# Patient Record
Sex: MALE | Race: WHITE | Employment: UNEMPLOYED | URBAN - METROPOLITAN AREA
[De-identification: names, ages, dates, MRNs, and addresses within clinical notes are randomized per-mention and may not be internally consistent; named-entity substitution may affect disease eponyms.]

---

## 2017-03-07 ENCOUNTER — OFFICE VISIT (OUTPATIENT)
Dept: INTERNAL MEDICINE CLINIC | Age: 22
End: 2017-03-07

## 2017-03-07 VITALS
SYSTOLIC BLOOD PRESSURE: 126 MMHG | HEART RATE: 75 BPM | BODY MASS INDEX: 30.24 KG/M2 | HEIGHT: 71 IN | TEMPERATURE: 97.5 F | WEIGHT: 216 LBS | DIASTOLIC BLOOD PRESSURE: 82 MMHG | RESPIRATION RATE: 16 BRPM

## 2017-03-07 DIAGNOSIS — R10.84 GENERALIZED ABDOMINAL PAIN: ICD-10-CM

## 2017-03-07 DIAGNOSIS — K52.9 GASTROENTERITIS: Primary | ICD-10-CM

## 2017-03-07 RX ORDER — PROMETHAZINE HYDROCHLORIDE 12.5 MG/1
12.5 TABLET ORAL
Qty: 15 TAB | Refills: 0 | Status: SHIPPED | OUTPATIENT
Start: 2017-03-07 | End: 2017-12-21 | Stop reason: ALTCHOICE

## 2017-03-07 NOTE — MR AVS SNAPSHOT
Visit Information Date & Time Provider Department Dept. Phone Encounter #  
 3/7/2017  1:30 PM Ernesto Albrecht MD [place] [address] [id number] Follow-up Instructions Return if symptoms worsen or fail to improve. Follow-up and Disposition History Upcoming Health Maintenance Date Due  
 HPV AGE 9Y-34Y (1 of 3 - Male 3 Dose Series) 1/9/2007 INFLUENZA AGE 9 TO ADULT 8/1/2016 DTaP/Tdap/Td series (1 - Tdap) 1/9/2017 Allergies as of 3/7/2017  Review Complete On: 3/7/2017 By: Ernesto Albrecht MD  
 No Known Allergies Current Immunizations  Reviewed on 6/25/2012 Name Date PPD 6/25/2012, 9/28/2011 Not reviewed this visit You Were Diagnosed With   
  
 Codes Comments Gastroenteritis    -  Primary ICD-10-CM: K52.9 ICD-9-CM: 558.9 Generalized abdominal pain     ICD-10-CM: R10.84 ICD-9-CM: 789.07 Vitals BP Pulse Temp Resp Height(growth percentile) Weight(growth percentile) 126/82 75 97.5 °F (36.4 °C) 16 5' 11\" (1.803 m) 216 lb (98 kg) BMI Smoking Status 30.13 kg/m2 Never Smoker BMI and BSA Data Body Mass Index Body Surface Area  
 30.13 kg/m 2 2.22 m 2 Preferred Pharmacy Pharmacy Name Phone [facility] [address] [place] [address] [phone] Your Updated Medication List  
  
   
This list is accurate as of: 3/7/17  2:01 PM.  Always use your most recent med list.  
  
  
  
  
 ibuprofen 600 mg tablet Commonly known as:  MOTRIN Take 1 Tab by mouth every six (6) hours as needed for Pain. promethazine 12.5 mg tablet Commonly known as:  PHENERGAN Take 1 Tab by mouth every six (6) hours as needed for Nausea. Prescriptions Sent to Pharmacy Refills  
 promethazine (PHENERGAN) 12.5 mg tablet 0 Sig: Take 1 Tab by mouth every six (6) hours as needed for Nausea.   
 Class: Normal  
 Pharmacy: [facility] [id number] [place] [facility] [address] , [address] [facility] #: [phone] Route: Oral  
  
We Performed the Following AMYLASE C2089805 CPT(R)] CBC W/O DIFF [57558 CPT(R)]   
 LD [78222 CPT(R)] LIPASE [id number] CPT(R)] METABOLIC PANEL, COMPREHENSIVE [78114 CPT(R)] Follow-up Instructions Return if symptoms worsen or fail to improve. Patient Instructions Gastroenteritis: Care Instructions Your Care Instructions Gastroenteritis is an illness that may cause nausea, vomiting, and diarrhea. It is sometimes called \"stomach flu. \" It can be caused by bacteria or a virus. You will probably begin to feel better in 1 to 2 days. In the meantime, get plenty of rest and make sure you do not become dehydrated. Dehydration occurs when your body loses too much fluid. Follow-up care is a key part of your treatment and safety. Be sure to make and go to all appointments, and call your doctor if you are having problems. Its also a good idea to know your test results and keep a list of the medicines you take. How can you care for yourself at home? · If your doctor prescribed antibiotics, take them as directed. Do not stop taking them just because you feel better. You need to take the full course of antibiotics. · Drink plenty of fluids to prevent dehydration, enough so that your urine is light yellow or clear like water. Choose water and other caffeine-free clear liquids until you feel better. If you have kidney, heart, or liver disease and have to limit fluids, talk with your doctor before you increase your fluid intake. · Drink fluids slowly, in frequent, small amounts, because drinking too much too fast can cause vomiting. · Begin eating mild foods, such as dry toast, yogurt, applesauce, bananas, and rice. Avoid spicy, hot, or high-fat foods, and do not drink alcohol or caffeine for a day or two. Do not drink milk or eat ice cream until you are feeling better. How to prevent gastroenteritis · Keep hot foods hot and cold foods cold. · Do not eat meats, dressings, salads, or other foods that have been kept at room temperature for more than 2 hours. · Use a thermometer to check your refrigerator. It should be between 34°F and 40°F. 
· Defrost meats in the refrigerator or microwave, not on the kitchen counter. · Keep your hands and your kitchen clean. Wash your hands, cutting boards, and countertops with hot soapy water frequently. · Cook meat until it is well done. · Do not eat raw eggs or uncooked sauces made with raw eggs. · Do not take chances. If food looks or tastes spoiled, throw it out. When should you call for help? Call 911 anytime you think you may need emergency care. For example, call if: 
· You vomit blood or what looks like coffee grounds. · You passed out (lost consciousness). · You pass maroon or very bloody stools. Call your doctor now or seek immediate medical care if: 
· You have severe belly pain. · You have signs of needing more fluids. You have sunken eyes, a dry mouth, and pass only a little dark urine. · You feel like you are going to faint. · You have increased belly pain that does not go away in 1 to 2 days. · You have new or increased nausea, or you are vomiting. · You have a new or higher fever. · Your stools are black and tarlike or have streaks of blood. Watch closely for changes in your health, and be sure to contact your doctor if: 
· You are dizzy or lightheaded. · You urinate less than usual, or your urine is dark yellow or brown. · You do not feel better with each day that goes by. Where can you learn more? Go to http://eric-gladys.info/. Enter N142 in the search box to learn more about \"Gastroenteritis: Care Instructions. \" Current as of: May 24, 2016 Content Version: 11.1 © 3007-0588 [employer].  Care instructions adapted under license by [address] (which disclaims liability or warranty for this information). If you have questions about a medical condition or this instruction, always ask your healthcare professional. [address] any warranty or liability for your use of this information. Introducing [facility] & HEALTH SERVICES! Gian Dylon introduces [employer] patient portal. Now you can access parts of your medical record, email your doctor's office, and request medication refills online. 1. In your internet browser, go to https://WP [email or web address]. [employer] 2. Click on the First Time User? Click Here link in the Sign In box. You will see the New Member Sign Up page. 3. Enter your [employer] Access Code exactly as it appears below. You will not need to use this code after youve completed the sign-up process. If you do not sign up before the expiration date, you must request a new code. · [employer] Access Code: [email or web address] Expires: 6/5/2017  2:01 PM 
 
4. Enter the last four digits of your Social Security Number (xxxx) and Date of Birth (mm/dd/yyyy) as indicated and click Submit. You will be taken to the next sign-up page. 5. Create a [employer] ID. This will be your [employer] login ID and cannot be changed, so think of one that is secure and easy to remember. 6. Create a [employer] password. You can change your password at any time. 7. Enter your Password Reset Question and Answer. This can be used at a later time if you forget your password. 8. Enter your e-mail address. You will receive e-mail notification when new information is available in [address] 9. Click Sign Up. You can now view and download portions of your medical record. 10. Click the Download Summary menu link to download a portable copy of your medical information. If you have questions, please visit the Frequently Asked Questions section of the [employer] website.  Remember, [employer] is NOT to be used for urgent needs. For medical emergencies, dial 911. Now available from your iPhone and Android! Please provide this summary of care documentation to your next provider. Your primary care clinician is listed as John Guzman. If you have any questions after today's visit, please call [phone].

## 2017-03-07 NOTE — PROGRESS NOTES
Chief Complaint   Patient presents with    Abdominal Pain     he is a 25y.o. year old male who presents for evaluation of  abdominal pain. The pain is described as dull, aching and knots, and is 3-8/10 in intensity. Pain is located in the RUQ, LUQ without radiation. Onset was 4 days ago. Symptoms have been unchanged since. Aggravating factors: activity, pressure and eating. Alleviating factors: none. Associated symptoms: anorexia, belching, chills, diarrhea, nausea and vomiting. The patient denies constipation. Reviewed and agree with Nurse Note and duplicated in this note. Reviewed PmHx, RxHx, FmHx, SocHx, AllgHx and updated and dated in the chart.     Family History   Problem Relation Age of Onset    Diabetes Father     High Cholesterol Father     Arthritis-osteo Father     Cancer Maternal Grandfather      colon cancer    Cancer Paternal Grandfather      lung cancer    Alcohol abuse Paternal Grandfather        Past Medical History:   Diagnosis Date    AR (allergic rhinitis)     Cholesteatoma     History of perforated ear drum     right    Cushing teeth extracted 2012      Social History     Social History    Marital status: SINGLE     Spouse name: N/A    Number of children: N/A    Years of education: N/A     Social History Main Topics    Smoking status: Never Smoker    Smokeless tobacco: Never Used    Alcohol use No    Drug use: No    Sexual activity: No     Other Topics Concern    Not on file     Social History Narrative    No narrative on file        Review of Systems - negative except as listed above      Objective:     Vitals:    03/07/17 1321   BP: 126/82   Pulse: 75   Resp: 16   Temp: 97.5 °F (36.4 °C)   Weight: 216 lb (98 kg)   Height: 5' 11\" (1.803 m)       Physical Examination: General appearance - alert, well appearing, and in no distress  Eyes - pupils equal and reactive, extraocular eye movements intact  Ears - bilateral TM's and external ear canals normal  Nose - normal and patent, no erythema, discharge or polyps  Mouth - mucous membranes moist, pharynx normal without lesions  Chest - clear to auscultation, no wheezes, rales or rhonchi, symmetric air entry  Heart - normal rate, regular rhythm, normal S1, S2, no murmurs, rubs, clicks or gallops  Abdomen - tenderness noted in RLQ, LLQ and LUQ  no rebound tenderness noted  bowel sounds hypoactive  Extremities - peripheral pulses normal, no pedal edema, no clubbing or cyanosis  Skin - normal coloration and turgor, no rashes, no suspicious skin lesions noted    Assessment/ Plan:   Pa House was seen today for abdominal pain. Diagnoses and all orders for this visit:    Gastroenteritis  -     CBC W/O DIFF  -     AMYLASE  -     LIPASE  -     METABOLIC PANEL, COMPREHENSIVE  -     LDH    Generalized abdominal pain       Follow-up Disposition: Not on File    I have discussed the diagnosis with the patient and the intended plan as seen in the above orders. The patient has received an after-visit summary and questions were answered concerning future plans. Medication Side Effects and Warnings were discussed with patient: yes  Patient Labs were reviewed and or requested: yes  Patient Past Records were reviewed and or requested  yes  I have discussed the diagnosis with the patient and the intended plan as seen in the above orders. The patient has received an after-visit summary and questions were answered concerning future plans. Pt agrees to call or return to clinic and/or go to closest ER with any worsening of symptoms. This may include, but not limited to increased fever (>100.4) with NSAIDS or Tylenol, increased edema, confusion, rash, worsening of presenting symptoms. 1) Remember to stay active and/or exercise regularly (I suggest 30-45 minutes daily)   2) For reliable dietary information, go to www. EATRIGHT.org. You may wish to consider seeing the nutritionist at [facility] at #[phone] or [phone], also consider the Mediterranean diet.   3) I routinely suggest a complete physical exam once each year (your birth month)

## 2017-03-07 NOTE — PATIENT INSTRUCTIONS
Gastroenteritis: Care Instructions  Your Care Instructions  Gastroenteritis is an illness that may cause nausea, vomiting, and diarrhea. It is sometimes called \"stomach flu. \" It can be caused by bacteria or a virus. You will probably begin to feel better in 1 to 2 days. In the meantime, get plenty of rest and make sure you do not become dehydrated. Dehydration occurs when your body loses too much fluid. Follow-up care is a key part of your treatment and safety. Be sure to make and go to all appointments, and call your doctor if you are having problems. Its also a good idea to know your test results and keep a list of the medicines you take. How can you care for yourself at home? · If your doctor prescribed antibiotics, take them as directed. Do not stop taking them just because you feel better. You need to take the full course of antibiotics. · Drink plenty of fluids to prevent dehydration, enough so that your urine is light yellow or clear like water. Choose water and other caffeine-free clear liquids until you feel better. If you have kidney, heart, or liver disease and have to limit fluids, talk with your doctor before you increase your fluid intake. · Drink fluids slowly, in frequent, small amounts, because drinking too much too fast can cause vomiting. · Begin eating mild foods, such as dry toast, yogurt, applesauce, bananas, and rice. Avoid spicy, hot, or high-fat foods, and do not drink alcohol or caffeine for a day or two. Do not drink milk or eat ice cream until you are feeling better. How to prevent gastroenteritis  · Keep hot foods hot and cold foods cold. · Do not eat meats, dressings, salads, or other foods that have been kept at room temperature for more than 2 hours. · Use a thermometer to check your refrigerator. It should be between 34°F and 40°F.  · Defrost meats in the refrigerator or microwave, not on the kitchen counter. · Keep your hands and your kitchen clean.  Wash your hands, cutting boards, and countertops with hot soapy water frequently. · Cook meat until it is well done. · Do not eat raw eggs or uncooked sauces made with raw eggs. · Do not take chances. If food looks or tastes spoiled, throw it out. When should you call for help? Call 911 anytime you think you may need emergency care. For example, call if:  · You vomit blood or what looks like coffee grounds. · You passed out (lost consciousness). · You pass maroon or very bloody stools. Call your doctor now or seek immediate medical care if:  · You have severe belly pain. · You have signs of needing more fluids. You have sunken eyes, a dry mouth, and pass only a little dark urine. · You feel like you are going to faint. · You have increased belly pain that does not go away in 1 to 2 days. · You have new or increased nausea, or you are vomiting. · You have a new or higher fever. · Your stools are black and tarlike or have streaks of blood. Watch closely for changes in your health, and be sure to contact your doctor if:  · You are dizzy or lightheaded. · You urinate less than usual, or your urine is dark yellow or brown. · You do not feel better with each day that goes by. Where can you learn more? Go to http://eric-gladys.info/. Enter N142 in the search box to learn more about \"Gastroenteritis: Care Instructions. \"  Current as of: May 24, 2016  Content Version: 11.1  © 4131-0575 [facility], Incorporated. Care instructions adapted under license by [employer] (which disclaims liability or warranty for this information). If you have questions about a medical condition or this instruction, always ask your healthcare professional. [address] any warranty or liability for your use of this information.

## 2017-03-08 LAB
ALBUMIN SERPL-MCNC: 3.9 G/DL (ref 3.5–5.5)
ALBUMIN/GLOB SERPL: 1.4 {RATIO} (ref 1.1–2.5)
ALP SERPL-CCNC: 62 IU/L (ref 39–117)
ALT SERPL-CCNC: 48 IU/L (ref 0–44)
AMYLASE SERPL-CCNC: 26 U/L (ref 31–124)
AST SERPL-CCNC: 32 IU/L (ref 0–40)
BILIRUB SERPL-MCNC: 0.4 MG/DL (ref 0–1.2)
BUN SERPL-MCNC: 11 MG/DL (ref 6–20)
BUN/CREAT SERPL: 9 (ref 8–19)
CALCIUM SERPL-MCNC: 8.8 MG/DL (ref 8.7–10.2)
CHLORIDE SERPL-SCNC: 95 MMOL/L (ref 96–106)
CO2 SERPL-SCNC: 26 MMOL/L (ref 18–29)
CREAT SERPL-MCNC: 1.24 MG/DL (ref 0.76–1.27)
ERYTHROCYTE [DISTWIDTH] IN BLOOD BY AUTOMATED COUNT: 14.2 % (ref 12.3–15.4)
GLOBULIN SER CALC-MCNC: 2.8 G/DL (ref 1.5–4.5)
GLUCOSE SERPL-MCNC: 93 MG/DL (ref 65–99)
HCT VFR BLD AUTO: 46.3 % (ref 37.5–51)
HGB BLD-MCNC: 16.1 G/DL (ref 12.6–17.7)
LDH SERPL-CCNC: 258 IU/L (ref 121–224)
LIPASE SERPL-CCNC: 17 U/L (ref 0–59)
MCH RBC QN AUTO: 30.9 PG (ref 26.6–33)
MCHC RBC AUTO-ENTMCNC: 34.8 G/DL (ref 31.5–35.7)
MCV RBC AUTO: 89 FL (ref 79–97)
NRBC BLD AUTO-RTO: 0 %
PLATELET # BLD AUTO: 212 X10E3/UL (ref 150–379)
POTASSIUM SERPL-SCNC: 4.5 MMOL/L (ref 3.5–5.2)
PROT SERPL-MCNC: 6.7 G/DL (ref 6–8.5)
RBC # BLD AUTO: 5.21 X10E6/UL (ref 4.14–5.8)
SODIUM SERPL-SCNC: 138 MMOL/L (ref 134–144)
WBC # BLD AUTO: 3.6 X10E3/UL (ref 3.4–10.8)

## 2017-12-21 ENCOUNTER — OFFICE VISIT (OUTPATIENT)
Dept: PRIMARY CARE CLINIC | Age: 22
End: 2017-12-21

## 2017-12-21 VITALS
TEMPERATURE: 98.6 F | BODY MASS INDEX: 32.84 KG/M2 | WEIGHT: 234.6 LBS | RESPIRATION RATE: 17 BRPM | HEART RATE: 89 BPM | OXYGEN SATURATION: 97 % | SYSTOLIC BLOOD PRESSURE: 134 MMHG | DIASTOLIC BLOOD PRESSURE: 87 MMHG | HEIGHT: 71 IN

## 2017-12-21 DIAGNOSIS — K52.9 GASTROENTERITIS: Primary | ICD-10-CM

## 2017-12-21 LAB
QUICKVUE INFLUENZA TEST: NEGATIVE
S PYO AG THROAT QL: NEGATIVE
VALID INTERNAL CONTROL?: YES
VALID INTERNAL CONTROL?: YES

## 2017-12-21 RX ORDER — ONDANSETRON 4 MG/1
4 TABLET, ORALLY DISINTEGRATING ORAL
Qty: 10 TAB | Refills: 0 | Status: SHIPPED | OUTPATIENT
Start: 2017-12-21 | End: 2018-01-05 | Stop reason: ALTCHOICE

## 2017-12-21 NOTE — LETTER
NOTIFICATION RETURN TO WORK / SCHOOL 
 
12/21/2017 8:57 AM 
 
Mr. Roman Basilio 
[facility] [address] 
[address] [address] To Whom It May Concern: 
 
Roman Basilio is currently under the care of [address] [place] [address]. He will return to work/school on 12/23/2017. If there are questions or concerns please have the patient contact our office. Sincerely, Mary Mcdowell NP

## 2017-12-21 NOTE — PROGRESS NOTES
Chief Complaint   Patient presents with    Chills    Generalized Body Aches    Vomiting   pt c/o above symptoms since yesterday, he states he has received flu shot. Pt states he has taken ibuprofen to help with discomfort. This note will not be viewable in [address]

## 2017-12-21 NOTE — MR AVS SNAPSHOT
Visit Information Date & Time Provider Department Dept. Phone Encounter #  
 12/21/2017  8:15 AM Alfredo Peter NP [address] [place] [address] [phone] [id number] Follow-up Instructions Return if symptoms worsen or fail to improve. Upcoming Health Maintenance Date Due  
 HPV AGE 9Y-34Y (1 of 3 - Male 3 Dose Series) 1/9/2007 DTaP/Tdap/Td series (1 - Tdap) 1/9/2017 Allergies as of 12/21/2017  Review Complete On: 12/21/2017 By: Alfredo Peter NP No Known Allergies Current Immunizations  Reviewed on 6/25/2012 Name Date PPD 6/25/2012, 9/28/2011 Not reviewed this visit You Were Diagnosed With   
  
 Codes Comments Gastroenteritis    -  Primary ICD-10-CM: K52.9 ICD-9-CM: 558. 9 Vitals BP Pulse Temp Resp Height(growth percentile) Weight(growth percentile) 134/87 (BP 1 Location: Left arm, BP Patient Position: Sitting) 89 98.6 °F (37 °C) (Oral) 17 5' 11\" (1.803 m) 234 lb 9.6 oz (106.4 kg) SpO2 BMI Smoking Status 97% 32.72 kg/m2 Never Smoker BMI and BSA Data Body Mass Index Body Surface Area 32.72 kg/m 2 2.31 m 2 Preferred Pharmacy Pharmacy Name Phone [facility] [address] [place] [address], [place] [address] [phone] Your Updated Medication List  
  
   
This list is accurate as of: 12/21/17  8:55 AM.  Always use your most recent med list.  
  
  
  
  
 ondansetron 4 mg disintegrating tablet Commonly known as:  ZOFRAN ODT Take 1 Tab by mouth every eight (8) hours as needed for Nausea. Prescriptions Sent to Pharmacy Refills  
 ondansetron (ZOFRAN ODT) 4 mg disintegrating tablet 0 Sig: Take 1 Tab by mouth every eight (8) hours as needed for Nausea. Class: Normal  
 Pharmacy: [facility] [address] [place] [address] [address], [address] [place] [address] #: [phone] Route: Oral  
  
Follow-up Instructions Return if symptoms worsen or fail to improve. Patient Instructions Gastroenteritis: Care Instructions Your Care Instructions Gastroenteritis is an illness that may cause nausea, vomiting, and diarrhea. It is sometimes called \"stomach flu. \" It can be caused by bacteria or a virus. You will probably begin to feel better in 1 to 2 days. In the meantime, get plenty of rest and make sure you do not become dehydrated. Dehydration occurs when your body loses too much fluid. Follow-up care is a key part of your treatment and safety. Be sure to make and go to all appointments, and call your doctor if you are having problems. It's also a good idea to know your test results and keep a list of the medicines you take. How can you care for yourself at home? · If your doctor prescribed antibiotics, take them as directed. Do not stop taking them just because you feel better. You need to take the full course of antibiotics. · Drink plenty of fluids to prevent dehydration, enough so that your urine is light yellow or clear like water. Choose water and other caffeine-free clear liquids until you feel better. If you have kidney, heart, or liver disease and have to limit fluids, talk with your doctor before you increase your fluid intake. · Drink fluids slowly, in frequent, small amounts, because drinking too much too fast can cause vomiting. · Begin eating mild foods, such as dry toast, yogurt, applesauce, bananas, and rice. Avoid spicy, hot, or high-fat foods, and do not drink alcohol or caffeine for a day or two. Do not drink milk or eat ice cream until you are feeling better. How to prevent gastroenteritis · Keep hot foods hot and cold foods cold. · Do not eat meats, dressings, salads, or other foods that have been kept at room temperature for more than 2 hours. · Use a thermometer to check your refrigerator. It should be between 34°F and 40°F. 
· Defrost meats in the refrigerator or microwave, not on the kitchen counter. · Keep your hands and your kitchen clean. Wash your hands, cutting boards, and countertops with hot soapy water frequently. · Cook meat until it is well done. · Do not eat raw eggs or uncooked sauces made with raw eggs. · Do not take chances. If food looks or tastes spoiled, throw it out. When should you call for help? Call 911 anytime you think you may need emergency care. For example, call if: 
? · You vomit blood or what looks like coffee grounds. ? · You passed out (lost consciousness). ? · You pass maroon or very bloody stools. ?Call your doctor now or seek immediate medical care if: 
? · You have severe belly pain. ? · You have signs of needing more fluids. You have sunken eyes, a dry mouth, and pass only a little dark urine. ? · You feel like you are going to faint. ? · You have increased belly pain that does not go away in 1 to 2 days. ? · You have new or increased nausea, or you are vomiting. ? · You have a new or higher fever. ? · Your stools are black and tarlike or have streaks of blood. ? Watch closely for changes in your health, and be sure to contact your doctor if: 
? · You are dizzy or lightheaded. ? · You urinate less than usual, or your urine is dark yellow or brown. ? · You do not feel better with each day that goes by. Where can you learn more? Go to http://eric-gladys.info/. Enter N142 in the search box to learn more about \"Gastroenteritis: Care Instructions. \" Current as of: March 3, 2017 Content Version: 11.4 © 0403-0206 [employer]. Care instructions adapted under license by [employer] (which disclaims liability or warranty for this information). If you have questions about a medical condition or this instruction, always ask your healthcare professional. [address] any warranty or liability for your use of this information. Introducing [facility] & HEALTH SERVICES! [facility] introduces [employer] patient portal. Now you can access parts of your medical record, email your doctor's office, and request medication refills online. 1. In your internet browser, go to [email or web address] 2. Click on the First Time User? Click Here link in the Sign In box. You will see the New Member Sign Up page. 3. Enter your [employer] Access Code exactly as it appears below. You will not need to use this code after youve completed the sign-up process. If you do not sign up before the expiration date, you must request a new code. · [employer] Access Code: [email or web address] Expires: 3/21/2018  8:54 AM 
 
4. Enter the last four digits of your Social Security Number (xxxx) and Date of Birth (mm/dd/yyyy) as indicated and click Submit. You will be taken to the next sign-up page. 5. Create a [employer] ID. This will be your [employer] login ID and cannot be changed, so think of one that is secure and easy to remember. 6. Create a [employer] password. You can change your password at any time. 7. Enter your Password Reset Question and Answer. This can be used at a later time if you forget your password. 8. Enter your e-mail address. You will receive e-mail notification when new information is available in [address] 9. Click Sign Up. You can now view and download portions of your medical record. 10. Click the Download Summary menu link to download a portable copy of your medical information. If you have questions, please visit the Frequently Asked Questions section of the [employer] website. Remember, [employer] is NOT to be used for urgent needs. For medical emergencies, dial 911. Now available from your iPhone and Android! Please provide this summary of care documentation to your next provider. If you have any questions after today's visit, please call [phone].

## 2017-12-21 NOTE — PROGRESS NOTES
HISTORY OF PRESENT ILLNESS  Kayla Saxena is a 25 y.o. male. Chills    Associated symptoms include vomiting. Generalized Body Aches     Vomiting    Associated symptoms include chills. Chief Complaint   Patient presents with    Chills    Generalized Body Aches    Vomiting     Pt presents today with c/o 1 day of nausea, vomiting, h/a, and diarrhea. He also c/o myalgias and chills. Denies any fevers. Denies any cough, runny nose, sore throat, or abdominal pain. He is taking Advil. He has not eaten today but has been drinking water today. No vomiting today. He has had a flu shot this season. Past Medical History:   Diagnosis Date    AR (allergic rhinitis)     Cholesteatoma     History of perforated ear drum     right    [place] teeth extracted 2012     Past Surgical History:   Procedure Laterality Date    HX TYMPANOSTOMY      LAP,INGUINAL HERNIA REPR,INITIAL       No Known Allergies    Review of Systems   Constitutional: Positive for chills. Gastrointestinal: Positive for vomiting. All other systems reviewed and are negative. Physical Exam   Constitutional: He is oriented to person, place, and time. He appears well-developed and well-nourished. No distress. HENT:   Head: Normocephalic and atraumatic. Right Ear: External ear normal.   Left Ear: External ear normal.   Nose: Nose normal.   Mouth/Throat: Uvula is midline and mucous membranes are normal. Posterior oropharyngeal edema (mild) present. No oropharyngeal exudate or tonsillar abscesses. Posterior oropharyngeal erythema: mild. Eyes: Conjunctivae, EOM and lids are normal. Pupils are equal, round, and reactive to light. Lids are everted and swept, no foreign bodies found. Neck: Normal range of motion. Neck supple. Cardiovascular: Normal rate, regular rhythm and normal heart sounds. Pulmonary/Chest: Effort normal and breath sounds normal.   Abdominal: Soft. Normal appearance. Bowel sounds are increased.  There is no hepatosplenomegaly. There is tenderness (mild tenderness) in the right upper quadrant, right lower quadrant, epigastric area and left upper quadrant. There is no rigidity and no guarding. Lymphadenopathy:     He has no cervical adenopathy. Neurological: He is alert and oriented to person, place, and time. He has normal strength. Gait normal.   Skin: Skin is warm, dry and intact. Results for orders placed or performed in visit on 12/21/17   AMB POC RAPID STREP A   Result Value Ref Range    VALID INTERNAL CONTROL POC Yes     Group A Strep Ag Negative Negative   AMB POC RAPID INFLUENZA TEST   Result Value Ref Range    VALID INTERNAL CONTROL POC Yes     QuickVue Influenza test Negative Negative     ASSESSMENT and PLAN    ICD-10-CM ICD-9-CM    1. Gastroenteritis K52.9 558.9 AMB POC RAPID STREP A      AMB POC RAPID INFLUENZA TEST     Orders Placed This Encounter    AMB POC RAPID STREP A    AMB POC RAPID INFLUENZA TEST    ondansetron (ZOFRAN ODT) 4 mg disintegrating tablet     Clear liquids, then advance slowly as tolerated. Work note given per request.  RTC prn. Josselin Hunter NP  This note will not be viewable in [address]

## 2017-12-21 NOTE — PATIENT INSTRUCTIONS
Gastroenteritis: Care Instructions  Your Care Instructions    Gastroenteritis is an illness that may cause nausea, vomiting, and diarrhea. It is sometimes called \"stomach flu. \" It can be caused by bacteria or a virus. You will probably begin to feel better in 1 to 2 days. In the meantime, get plenty of rest and make sure you do not become dehydrated. Dehydration occurs when your body loses too much fluid. Follow-up care is a key part of your treatment and safety. Be sure to make and go to all appointments, and call your doctor if you are having problems. It's also a good idea to know your test results and keep a list of the medicines you take. How can you care for yourself at home? · If your doctor prescribed antibiotics, take them as directed. Do not stop taking them just because you feel better. You need to take the full course of antibiotics. · Drink plenty of fluids to prevent dehydration, enough so that your urine is light yellow or clear like water. Choose water and other caffeine-free clear liquids until you feel better. If you have kidney, heart, or liver disease and have to limit fluids, talk with your doctor before you increase your fluid intake. · Drink fluids slowly, in frequent, small amounts, because drinking too much too fast can cause vomiting. · Begin eating mild foods, such as dry toast, yogurt, applesauce, bananas, and rice. Avoid spicy, hot, or high-fat foods, and do not drink alcohol or caffeine for a day or two. Do not drink milk or eat ice cream until you are feeling better. How to prevent gastroenteritis  · Keep hot foods hot and cold foods cold. · Do not eat meats, dressings, salads, or other foods that have been kept at room temperature for more than 2 hours. · Use a thermometer to check your refrigerator. It should be between 34°F and 40°F.  · Defrost meats in the refrigerator or microwave, not on the kitchen counter. · Keep your hands and your kitchen clean.  Wash your hands, cutting boards, and countertops with hot soapy water frequently. · Cook meat until it is well done. · Do not eat raw eggs or uncooked sauces made with raw eggs. · Do not take chances. If food looks or tastes spoiled, throw it out. When should you call for help? Call 911 anytime you think you may need emergency care. For example, call if:  ? · You vomit blood or what looks like coffee grounds. ? · You passed out (lost consciousness). ? · You pass maroon or very bloody stools. ?Call your doctor now or seek immediate medical care if:  ? · You have severe belly pain. ? · You have signs of needing more fluids. You have sunken eyes, a dry mouth, and pass only a little dark urine. ? · You feel like you are going to faint. ? · You have increased belly pain that does not go away in 1 to 2 days. ? · You have new or increased nausea, or you are vomiting. ? · You have a new or higher fever. ? · Your stools are black and tarlike or have streaks of blood. ? Watch closely for changes in your health, and be sure to contact your doctor if:  ? · You are dizzy or lightheaded. ? · You urinate less than usual, or your urine is dark yellow or brown. ? · You do not feel better with each day that goes by. Where can you learn more? Go to http://eric-gladys.info/. Enter N142 in the search box to learn more about \"Gastroenteritis: Care Instructions. \"  Current as of: March 3, 2017  Content Version: 11.4  © 4777-9678 [employer]. Care instructions adapted under license by [employer] (which disclaims liability or warranty for this information). If you have questions about a medical condition or this instruction, always ask your healthcare professional. [address] any warranty or liability for your use of this information.

## 2018-01-05 ENCOUNTER — OFFICE VISIT (OUTPATIENT)
Dept: PRIMARY CARE CLINIC | Age: 23
End: 2018-01-05

## 2018-01-05 VITALS
DIASTOLIC BLOOD PRESSURE: 73 MMHG | WEIGHT: 221.2 LBS | BODY MASS INDEX: 30.97 KG/M2 | RESPIRATION RATE: 17 BRPM | HEART RATE: 101 BPM | TEMPERATURE: 99.5 F | HEIGHT: 71 IN | SYSTOLIC BLOOD PRESSURE: 108 MMHG | OXYGEN SATURATION: 95 %

## 2018-01-05 DIAGNOSIS — J03.90 TONSILLITIS: Primary | ICD-10-CM

## 2018-01-05 DIAGNOSIS — R68.89 FLU-LIKE SYMPTOMS: ICD-10-CM

## 2018-01-05 RX ORDER — OSELTAMIVIR PHOSPHATE 75 MG/1
75 CAPSULE ORAL 2 TIMES DAILY
Qty: 10 CAP | Refills: 0 | Status: SHIPPED | OUTPATIENT
Start: 2018-01-05 | End: 2018-01-10

## 2018-01-05 RX ORDER — AMOXICILLIN AND CLAVULANATE POTASSIUM 875; 125 MG/1; MG/1
1 TABLET, FILM COATED ORAL EVERY 12 HOURS
Qty: 20 TAB | Refills: 0 | Status: SHIPPED | OUTPATIENT
Start: 2018-01-05 | End: 2018-01-15

## 2018-01-05 NOTE — LETTER
NOTIFICATION RETURN TO WORK / SCHOOL 
 
1/5/2018 4:14 PM 
 
Mr. Iglesia Dumont 
[facility] 
[address] [address] To Whom It May Concern: 
 
Iglesia Dumont is currently under the care of [address] [place] [address]. He will return to work/school on 1/8/2018. If there are questions or concerns please have the patient contact our office. Sincerely, Fernando Earl NP

## 2018-01-05 NOTE — PATIENT INSTRUCTIONS
Tonsillitis: Care Instructions  Your Care Instructions    Tonsillitis is an infection of the tonsils that is caused by bacteria or a virus. The tonsils are in the back of the throat and are part of the immune system. Tonsillitis typically lasts from a few days up to a couple of weeks. Tonsillitis caused by a virus goes away on its own. Tonsillitis caused by the bacteria that causes strep throat is treated with antibiotics. You and your doctor may consider surgery to remove the tonsils (tonsillectomy) if you have serious complications or repeat infections. Follow-up care is a key part of your treatment and safety. Be sure to make and go to all appointments, and call your doctor if you are having problems. It's also a good idea to know your test results and keep a list of the medicines you take. How can you care for yourself at home? · If your doctor prescribed antibiotics, take them as directed. Do not stop taking them just because you feel better. You need to take the full course of antibiotics. · Gargle with warm salt water. This helps reduce swelling and relieve discomfort. Gargle once an hour with 1 teaspoon of salt mixed in 8 fluid ounces of warm water. · Take an over-the-counter pain medicine, such as acetaminophen (Tylenol), ibuprofen (Advil, Motrin), or naproxen (Aleve). Be safe with medicines. Read and follow all instructions on the label. No one younger than 20 should take aspirin. It has been linked to Reye syndrome, a serious illness. · Be careful when taking over-the-counter cold or flu medicines and Tylenol at the same time. Many of these medicines have acetaminophen, which is Tylenol. Read the labels to make sure that you are not taking more than the recommended dose. Too much acetaminophen (Tylenol) can be harmful. · Try an over-the-counter throat spray to relieve throat pain. · Drink plenty of fluids. Fluids may help soothe an irritated throat.  Drink warm or cool liquids (whichever feels better). These include tea, soup, and juice. · Do not smoke, and avoid secondhand smoke. Smoking can make tonsillitis worse. If you need help quitting, talk to your doctor about stop-smoking programs and medicines. These can increase your chances of quitting for good. · Use a vaporizer or humidifier to add moisture to your bedroom. Follow the directions for cleaning the machine. When should you call for help? Call your doctor now or seek immediate medical care if:  ? · Your pain gets worse on one side of your throat. ? · You have a new or higher fever. ? · You notice changes in your voice. ? · You have trouble opening your mouth. ? · You have any trouble breathing. ? · You have much more trouble swallowing. ? · You have a fever with a stiff neck or a severe headache. ? · You are sensitive to light or feel very sleepy or confused. ? Watch closely for changes in your health, and be sure to contact your doctor if:  ? · You do not get better after 2 days. Where can you learn more? Go to http://eric-gladys.info/. Enter P202 in the search box to learn more about \"Tonsillitis: Care Instructions. \"  Current as of: May 12, 2017  Content Version: 11.4  © 4547-1860 [employer]. Care instructions adapted under license by [employer] (which disclaims liability or warranty for this information). If you have questions about a medical condition or this instruction, always ask your healthcare professional. [place] [address] any warranty or liability for your use of this information.

## 2018-01-05 NOTE — PROGRESS NOTES
Chief Complaint   Patient presents with    Fever    Generalized Body Aches    Sore Throat   c/o fever, body aches, sore throat, sweating x 1 day, pt states he has been alternating between tylenol and advil to help with discomfort.

## 2018-01-05 NOTE — MR AVS SNAPSHOT
Visit Information Date & Time Provider Department Dept. Phone Encounter #  
 1/5/2018  3:45 PM Juan Polanco NP [address] [facility] [phone] [id number] Follow-up Instructions Return if symptoms worsen or fail to improve. Upcoming Health Maintenance Date Due  
 HPV AGE 9Y-34Y (1 of 3 - Male 3 Dose Series) 1/9/2007 DTaP/Tdap/Td series (1 - Tdap) 1/9/2017 Allergies as of 1/5/2018  Review Complete On: 1/5/2018 By: Juan Polanco NP No Known Allergies Current Immunizations  Reviewed on 6/25/2012 Name Date PPD 6/25/2012, 9/28/2011 Not reviewed this visit You Were Diagnosed With   
  
 Codes Comments Tonsillitis    -  Primary ICD-10-CM: J03.90 ICD-9-CM: 466 Flu-like symptoms     ICD-10-CM: R68.89 ICD-9-CM: 780.99 Vitals BP Pulse Temp Resp Height(growth percentile) Weight(growth percentile) 108/73 (BP 1 Location: Left arm, BP Patient Position: Sitting) (!) 101 99.5 °F (37.5 °C) (Oral) 17 5' 11\" (1.803 m) 221 lb 3.2 oz (100.3 kg) SpO2 BMI Smoking Status 95% 30.85 kg/m2 Never Smoker Vitals History BMI and BSA Data Body Mass Index Body Surface Area  
 30.85 kg/m 2 2.24 m 2 Preferred Pharmacy Pharmacy Name Phone [facility] [address] [place] [facility] [address] [facility], [place] [address] [phone] Your Updated Medication List  
  
   
This list is accurate as of: 1/5/18  4:13 PM.  Always use your most recent med list.  
  
  
  
  
 amoxicillin-clavulanate 875-125 mg per tablet Commonly known as:  AUGMENTIN Take 1 Tab by mouth every twelve (12) hours for 10 days. magic mouthwash solution Take 15 mL by mouth every four (4) hours as needed for Stomatitis. Magic mouth wash  Maalox Lidocaine 2% viscous  Diphenhydramine oral solution   Pharmacy to mix equal portions of ingredients to a total volume as indicated in the dispense amount. oseltamivir 75 mg capsule Commonly known as:  TAMIFLU Take 1 Cap by mouth two (2) times a day for 5 days. Prescriptions Printed Refills  
 magic mouthwash solution 0 Sig: Take 15 mL by mouth every four (4) hours as needed for Stomatitis. Magic mouth wash Maalox Lidocaine 2% viscous Diphenhydramine oral solution Pharmacy to mix equal portions of ingredients to a total volume as indicated in the dispense amount. Class: Print Route: Oral  
  
Prescriptions Sent to Pharmacy Refills  
 amoxicillin-clavulanate (AUGMENTIN) 875-125 mg per tablet 0 Sig: Take 1 Tab by mouth every twelve (12) hours for 10 days. Class: Normal  
 Pharmacy: [facility] [address] [place] [address], [address] [place] [address] Ph #: [phone] Route: Oral  
 oseltamivir (TAMIFLU) 75 mg capsule 0 Sig: Take 1 Cap by mouth two (2) times a day for 5 days. Class: Normal  
 Pharmacy: [facility] [address] [place] [address], [address] [place] [address] Ph #: [phone] Route: Oral  
  
We Performed the Following CULTURE, STREP THROAT [id number] CPT(R)] Follow-up Instructions Return if symptoms worsen or fail to improve. Patient Instructions Tonsillitis: Care Instructions Your Care Instructions Tonsillitis is an infection of the tonsils that is caused by bacteria or a virus. The tonsils are in the back of the throat and are part of the immune system. Tonsillitis typically lasts from a few days up to a couple of weeks. Tonsillitis caused by a virus goes away on its own. Tonsillitis caused by the bacteria that causes strep throat is treated with antibiotics. You and your doctor may consider surgery to remove the tonsils (tonsillectomy) if you have serious complications or repeat infections. Follow-up care is a key part of your treatment and safety.  Be sure to make and go to all appointments, and call your doctor if you are having problems. It's also a good idea to know your test results and keep a list of the medicines you take. How can you care for yourself at home? · If your doctor prescribed antibiotics, take them as directed. Do not stop taking them just because you feel better. You need to take the full course of antibiotics. · Gargle with warm salt water. This helps reduce swelling and relieve discomfort. Gargle once an hour with 1 teaspoon of salt mixed in 8 fluid ounces of warm water. · Take an over-the-counter pain medicine, such as acetaminophen (Tylenol), ibuprofen (Advil, Motrin), or naproxen (Aleve). Be safe with medicines. Read and follow all instructions on the label. No one younger than 20 should take aspirin. It has been linked to Reye syndrome, a serious illness. · Be careful when taking over-the-counter cold or flu medicines and Tylenol at the same time. Many of these medicines have acetaminophen, which is Tylenol. Read the labels to make sure that you are not taking more than the recommended dose. Too much acetaminophen (Tylenol) can be harmful. · Try an over-the-counter throat spray to relieve throat pain. · Drink plenty of fluids. Fluids may help soothe an irritated throat. Drink warm or cool liquids (whichever feels better). These include tea, soup, and juice. · Do not smoke, and avoid secondhand smoke. Smoking can make tonsillitis worse. If you need help quitting, talk to your doctor about stop-smoking programs and medicines. These can increase your chances of quitting for good. · Use a vaporizer or humidifier to add moisture to your bedroom. Follow the directions for cleaning the machine. When should you call for help? Call your doctor now or seek immediate medical care if: 
? · Your pain gets worse on one side of your throat. ? · You have a new or higher fever. ? · You notice changes in your voice. ? · You have trouble opening your mouth. ? · You have any trouble breathing. ? · You have much more trouble swallowing. ? · You have a fever with a stiff neck or a severe headache. ? · You are sensitive to light or feel very sleepy or confused. ? Watch closely for changes in your health, and be sure to contact your doctor if: 
? · You do not get better after 2 days. Where can you learn more? Go to http://eric-gladys.info/. Enter B498 in the search box to learn more about \"Tonsillitis: Care Instructions. \" Current as of: May 12, 2017 Content Version: 11.4 © 1875-1030 [employer]. Care instructions adapted under license by [employer] (which disclaims liability or warranty for this information). If you have questions about a medical condition or this instruction, always ask your healthcare professional. [address] any warranty or liability for your use of this information. Introducing [facility]! Pawel Corley introduces [email or web address] patient portal. Now you can access parts of your medical record, email your doctor's office, and request medication refills online. 1. In your internet browser, go to [email or web address] 2. Click on the First Time User? Click Here link in the Sign In box. You will see the New Member Sign Up page. 3. Enter your [email or web address] Access Code exactly as it appears below. You will not need to use this code after youve completed the sign-up process. If you do not sign up before the expiration date, you must request a new code. · [email or web address] Access Code: [email or web address] Expires: 3/21/2018  8:54 AM 
 
4. Enter the last four digits of your Social Security Number (xxxx) and Date of Birth (mm/dd/yyyy) as indicated and click Submit. You will be taken to the next sign-up page. 5. Create a [email or web address] ID. This will be your [email or web address] login ID and cannot be changed, so think of one that is secure and easy to remember. 6. Create a [employer] password. You can change your password at any time. 7. Enter your Password Reset Question and Answer. This can be used at a later time if you forget your password. 8. Enter your e-mail address. You will receive e-mail notification when new information is available in [address] 9. Click Sign Up. You can now view and download portions of your medical record. 10. Click the Download Summary menu link to download a portable copy of your medical information. If you have questions, please visit the Frequently Asked Questions section of the [employer] website. Remember, [employer] is NOT to be used for urgent needs. For medical emergencies, dial 911. Now available from your iPhone and Android! Please provide this summary of care documentation to your next provider. Your primary care clinician is listed as Lele Harvey. If you have any questions after today's visit, please call [phone].

## 2018-01-08 LAB — S PYO THROAT QL CULT: ABNORMAL

## 2018-01-09 NOTE — PROGRESS NOTES
Subjective:   Viry Silva is a 26 y.o. male who complains of sore throat, myalgias, headache, fever, sweats, and chills for 1 day, stable since that time. He denies a history of shortness of breath, nausea, vomiting and wheezing. His mother accompanies him today. Evaluation to date: none. Treatment to date: OTC products. Patient does not smoke cigarettes. Relevant PMH:   Past Medical History:   Diagnosis Date    AR (allergic rhinitis)     Cholesteatoma     History of perforated ear drum     right    [place] teeth extracted 2012     Past Surgical History:   Procedure Laterality Date    HX TYMPANOSTOMY      LAP,INGUINAL HERNIA REPR,INITIAL       No Known Allergies      Review of Systems  Pertinent items are noted in HPI. Objective:     Visit Vitals    /73 (BP 1 Location: Left arm, BP Patient Position: Sitting)    Pulse (!) 101    Temp 99.5 °F (37.5 °C) (Oral)    Resp 17    Ht 5' 11\" (1.803 m)    Wt 221 lb 3.2 oz (100.3 kg)    SpO2 95%    BMI 30.85 kg/m2     General:  alert, cooperative, no distress, but appears acutely ill   Eyes: Watery, no discharge   Ears: normal TM's and external ear canals AU   Sinuses: Normal paranasal sinuses without tenderness   Mouth:  Lips, mucosa, and tongue normal. Teeth and gums normal and abnormal findings: moderate oropharyngeal erythema, tonsillar hypertrophy 2+ and exudates present   Neck: supple, symmetrical, trachea midline and mild anterior cervical adenopathy. Heart: S1 and S2 normal, no murmurs noted. Lungs: clear to auscultation bilaterally   Abdomen: soft, non-tender. Bowel sounds normal. No masses,  no organomegaly        Rapid strep and flu swabs - negative    Assessment/Plan:       ICD-10-CM ICD-9-CM    1. Tonsillitis J03.90 463 CULTURE, STREP THROAT      AMB POC RAPID STREP A   2.  Flu-like symptoms R68.89 780.99 AMB POC RAPID INFLUENZA TEST     Orders Placed This Encounter    CULTURE, STREP THROAT    AMB POC RAPID STREP A    AMB POC RAPID INFLUENZA TEST    amoxicillin-clavulanate (AUGMENTIN) 875-125 mg per tablet    oseltamivir (TAMIFLU) 75 mg capsule    magic mouthwash solution     Will start Tamiflu for FLI and Augmentin for tonsillitis. Will send strep culture. Suggested symptomatic OTC remedies. Antibiotics per orders. RTC prn. Isha Fields NP  This note will not be viewable in [address]

## 2018-01-11 NOTE — PROGRESS NOTES
Called and spoke with pts mother, verified on HIPPA, advised that strep culture was negative, but does show beta-hemolytic colonies, advised that patient needs to finish full course of antibiotics. Voiced understanding, she states son was feeling much better since taking abx, very appreciative of phone call

## 2018-01-24 ENCOUNTER — OFFICE VISIT (OUTPATIENT)
Dept: INTERNAL MEDICINE CLINIC | Age: 23
End: 2018-01-24

## 2018-01-24 VITALS
HEIGHT: 71 IN | SYSTOLIC BLOOD PRESSURE: 116 MMHG | BODY MASS INDEX: 36.68 KG/M2 | DIASTOLIC BLOOD PRESSURE: 71 MMHG | WEIGHT: 262 LBS | HEART RATE: 67 BPM | RESPIRATION RATE: 17 BRPM | OXYGEN SATURATION: 97 % | TEMPERATURE: 98.5 F

## 2018-01-24 DIAGNOSIS — J32.0 MAXILLARY SINUSITIS, UNSPECIFIED CHRONICITY: Primary | ICD-10-CM

## 2018-01-24 DIAGNOSIS — Z20.9 INFECTIOUS DISEASE EXPOSURE: ICD-10-CM

## 2018-01-24 DIAGNOSIS — L90.6 STRETCH MARKS: ICD-10-CM

## 2018-01-24 RX ORDER — AZITHROMYCIN 250 MG/1
TABLET, FILM COATED ORAL
Qty: 6 TAB | Refills: 0 | Status: SHIPPED | OUTPATIENT
Start: 2018-01-24 | End: 2018-01-29

## 2018-01-24 NOTE — MR AVS SNAPSHOT
[address] [place] [address]. [place] [address] [id number] 
[phone] Patient: Adeline Torres MRN: [id number] :1995 Visit Information Date & Time Provider Department Dept. Phone Encounter #  
 2018  2:45 PM Nuha Aj MD [facility] [phone] [id number] Follow-up Instructions Return if symptoms worsen or fail to improve. Follow-up and Disposition History Upcoming Health Maintenance Date Due DTaP/Tdap/Td series (1 - Tdap) 2017 Allergies as of 2018  Review Complete On: 2018 By: Nuha Aj MD  
 No Known Allergies Current Immunizations  Reviewed on 2012 Name Date PPD 2012, 2011 Not reviewed this visit You Were Diagnosed With   
  
 Codes Comments Maxillary sinusitis, unspecified chronicity    -  Primary ICD-10-CM: J32.0 ICD-9-CM: 473.0 Infectious disease exposure     ICD-10-CM: Z20.9 ICD-9-CM: V01.9 Stretch marks     ICD-10-CM: L90.6 ICD-9-CM: 701.3 Vitals BP Pulse Temp Resp Height(growth percentile) Weight(growth percentile) 116/71 (BP 1 Location: Left arm, BP Patient Position: Sitting) 67 98.5 °F (36.9 °C) (Oral) 17 5' 11\" (1.803 m) 262 lb (118.8 kg) SpO2 BMI Smoking Status 97% 36.54 kg/m2 Never Smoker Vitals History BMI and BSA Data Body Mass Index Body Surface Area  
 36.54 kg/m 2 2.44 m 2 Preferred Pharmacy Pharmacy Name Phone [facility] [address] [place] [facility] - [address] , [place] [address] [phone] Your Updated Medication List  
  
   
This list is accurate as of: 18  3:14 PM.  Always use your most recent med list.  
  
  
  
  
 azithromycin 250 mg tablet Commonly known as:  Porfirio Brand Take 2 tablets today, then take 1 tablet daily  
  
 magic mouthwash solution Take 15 mL by mouth every four (4) hours as needed for Stomatitis.  Magic mouth wash  Maalox Lidocaine 2% viscous  Diphenhydramine oral solution   Pharmacy to mix equal portions of ingredients to a total volume as indicated in the dispense amount. Prescriptions Sent to Pharmacy Refills  
 azithromycin (ZITHROMAX) 250 mg tablet 0 Sig: Take 2 tablets today, then take 1 tablet daily Class: Normal  
 Pharmacy: [facility] [id number] [place] [facility] - [address], [address]  #: [phone] We Performed the Following HEPATITIS PANEL, ACUTE [74083 CPT(R)] HIV 1/2 AG/AB, 4TH GENERATION,W RFLX CONFIRM [id number] CPT(R)] REFERRAL TO DERMATOLOGY [REF19 Custom] Follow-up Instructions Return if symptoms worsen or fail to improve. Referral Information Referral ID Referred By Referred To  
  
 [id number] [address], [address] Phone: [phone] Fax: [phone] Visits Status Start Date End Date 1 New Request 1/24/18 1/24/19 If your referral has a status of pending review or denied, additional information will be sent to support the outcome of this decision. Patient Instructions Adults: For nasal congestion, cough and cold/flu symptoms I advised: - Seek medical care if symptoms become more severe or if you develop  
   chest pain, shortness of breath, confusion. 
  - Contact us if your symptoms fail to improve after 7-10 days - Rest as much as possible and stay home from work/school at least 24 hours  
               after last fever - Wash hand frequently and cough/sneeze into your sleeve to help prevent  
    infection of others - Drink plenty of fluids - Ibuprofen (Advil, Motrin) 400-800mg every 6 hours or Aleve 220 mg 1-2 pills every 8 hours for fever, headache, pain - Tylenol extra strength 500 mg every 6 hours for pain, headache, fever 
 - Nasal saline rinses 2-3 times daily for nasal congestion - Mucinex 1200 mg twice daily or Guaifenesin 400 mg every 4 hours for chest  
    congestion - Robitussin DM or Delsym for cough(suppress cough and thin mucus. ) 
 - Cepacol throat lozenges and saline gargles (1 tsp salt in 8 oz water) for sore  
    throat - Tea with honey for cough (buckwheat honey preferred) - Benadryl (diphenhydramine) 50 mg at night for nasal congestion/allergies - Pseudoephedrine 12-hour tablets twice daily for nasal and inner ear    
  -Ask your pharmacist (this is kept behind the counter) -If you have high blood pressure or heart disease, use this    
   medication with caution (ask your doctor), alternative coricidin - Afrin (oxymetazoline) nasal spray 2 sprays in each nostril twice daily for severe  
   congestion.   
   -Do not use this medication for more than 3 days as it may cause \"rebound congestion\". -If you have high blood pressure or heart disease, use this medication  
    with caution (ask your doctor) Introducing [facility]! [facility] introduces 4moms patient portal. Now you can access parts of your medical record, email your doctor's office, and request medication refills online. 1. In your internet browser, go to [email or web address] 2. Click on the First Time User? Click Here link in the Sign In box. You will see the New Member Sign Up page. 3. Enter your 4moms Access Code exactly as it appears below. You will not need to use this code after youve completed the sign-up process. If you do not sign up before the expiration date, you must request a new code. · 4moms Access Code: [email or web address] Expires: 3/21/2018  8:54 AM 
 
4. Enter the last four digits of your Social Security Number (xxxx) and Date of Birth (mm/dd/yyyy) as indicated and click Submit. You will be taken to the next sign-up page. 5. Create a 4moms ID.  This will be your 4moms login ID and cannot be changed, so think of one that is secure and easy to remember. 6. Create a Image Socket password. You can change your password at any time. 7. Enter your Password Reset Question and Answer. This can be used at a later time if you forget your password. 8. Enter your e-mail address. You will receive e-mail notification when new information is available in [address] 9. Click Sign Up. You can now view and download portions of your medical record. 10. Click the Download Summary menu link to download a portable copy of your medical information. If you have questions, please visit the Frequently Asked Questions section of the Image Socket website. Remember, Image Socket is NOT to be used for urgent needs. For medical emergencies, dial 911. Now available from your iPhone and Android! Please provide this summary of care documentation to your next provider. Your primary care clinician is listed as Tom Casey. If you have any questions after today's visit, please call [phone].

## 2018-01-24 NOTE — PROGRESS NOTES
Chief Complaint   Patient presents with    Cold Symptoms     he is a 26y.o. year old male who presents for evaluation of cold symptom  Cold congestion for 2 weeks. no nausea and no vomiting . No fever. Over-the-counter remedies including None. 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 Patient would like to get tested for these diseases due to possible contact with saliva or blood. Denies any symptoms. Reviewed and agree with Nurse Note and duplicated in this note. Reviewed PmHx, RxHx, FmHx, SocHx, AllgHx and updated and dated in the chart.     Family History   Problem Relation Age of Onset    Diabetes Father     High Cholesterol Father     Arthritis-osteo Father     Cancer Maternal Grandfather      colon cancer    Cancer Paternal Grandfather      lung cancer    Alcohol abuse Paternal Grandfather        Past Medical History:   Diagnosis Date    AR (allergic rhinitis)     Cholesteatoma     History of perforated ear drum     right    [place] teeth extracted 2012      Social History     Social History    Marital status: SINGLE     Spouse name: N/A    Number of children: N/A    Years of education: N/A     Social History Main Topics    Smoking status: Never Smoker    Smokeless tobacco: Never Used    Alcohol use No    Drug use: No    Sexual activity: No     Other Topics Concern    None     Social History Narrative        Review of Systems - negative except as listed above      Objective:     Vitals:    01/24/18 1444   BP: 116/71   Pulse: 67   Resp: 17   Temp: 98.5 °F (36.9 °C)   TempSrc: Oral   SpO2: 97%   Weight: 262 lb (118.8 kg)   Height: 5' 11\" (1.803 m)       Physical Examination: General appearance - alert, well appearing, and in no distress  Eyes - pupils equal and reactive, extraocular eye movements intact  Ears - bilateral TM's and external ear canals normal  Nose - clear rhinorrhea and purulent rhinorrhea  Mouth - erythematous  Neck - supple, no significant adenopathy  Chest - clear to auscultation, no wheezes, rales or rhonchi, symmetric air entry  Heart - normal rate, regular rhythm, normal S1, S2, no murmurs, rubs, clicks or gallops  Abdomen - soft, nontender, nondistended, no masses or organomegaly  Neurological - alert, oriented, normal speech, no focal findings or movement disorder noted  Musculoskeletal - no joint tenderness, deformity or swelling  Extremities - peripheral pulses normal, no pedal edema, no clubbing or cyanosis  Skin -stretch marks in the right axilla through right pectoral region    Assessment/ Plan:   Diagnoses and all orders for this visit:    1. Maxillary sinusitis, unspecified chronicity    2. Infectious disease exposure  -     HEPATITIS PANEL, ACUTE  -     HIV 1/2 AG/AB, 4TH GENERATION,W RFLX CONFIRM    3. Stretch marks  -     REFERRAL TO DERMATOLOGY    Other orders  -     azithromycin (ZITHROMAX) 250 mg tablet; Take 2 tablets today, then take 1 tablet daily     Follow-up Disposition: Not on File  Adults:  For nasal congestion, cough and cold/flu symptoms I advised:    - Seek medical care if symptoms become more severe or if you develop      chest pain, shortness of breath, confusion.    - Contact us if your symptoms fail to improve after 7-10 days   - Rest as much as possible and stay home from work/school at least 24 hours                  after last fever              - Wash hand frequently and cough/sneeze into your sleeve to help prevent       infection of others   - Drink plenty of fluids   - Ibuprofen (Advil, Motrin) 400-800mg every 6 hours or                  Aleve 220 mg 1-2 pills every 8 hours for fever, headache, pain   - Tylenol extra strength 500 mg every 6 hours for pain, headache, fever   - Nasal saline rinses 2-3 times daily for nasal congestion   - Mucinex 1200 mg twice daily or Guaifenesin 400 mg every 4 hours for chest       congestion              - Robitussin DM or Delsym for cough(suppress cough and thin mucus. )   - Cepacol throat lozenges and saline gargles (1 tsp salt in 8 oz water) for sore       throat   - Tea with honey for cough (buckwheat honey preferred)              - Benadryl (diphenhydramine) 50 mg at night for nasal congestion/allergies   - Pseudoephedrine 12-hour tablets twice daily for nasal and inner ear       -Ask your pharmacist (this is kept behind the counter)     -If you have high blood pressure or heart disease, use this        medication with caution (ask your doctor), alternative coricidin   - Afrin (oxymetazoline) nasal spray 2 sprays in each nostril twice daily for severe      congestion.       -Do not use this medication for more than 3 days as it may cause         \"rebound congestion\". -If you have high blood pressure or heart disease, use this medication       with caution (ask your doctor)      Children:   Sit in bathroom with hot shower running for steam.    Nasal saline rinses and Neti pot  In general for viral respiratory infection -  Aim for drainage/increased airflow  Increase fluids - Pedialyte popsicles, Gatorade mixed with 50% water       1) Remember to stay active and/or exercise regularly (I suggest 30-45 minutes daily)   2) For reliable dietary information, go to www. EATRIGHT.org. You may wish to consider seeing the nutritionist at [facility] [phone], also consider the [address] I have discussed the diagnosis with the patient and the intended plan as seen in the above orders. The patient has received an after-visit summary and questions were answered concerning future plans.      Medication Side Effects and Warnings were discussed with patient: yes  Patient Labs were reviewed and or requested: yes  Patient Past Records were reviewed and or requested  yes  I have discussed the diagnosis with the patient and the intended plan as seen in the above orders. The patient has received an after-visit summary and questions were answered concerning future plans. Pt agrees to call or return to clinic and/or go to closest ER with any worsening of symptoms. This may include, but not limited to increased fever (>100.4) with NSAIDS or Tylenol, increased edema, confusion, rash, worsening of presenting symptoms.

## 2018-01-26 LAB
HAV IGM SERPL QL IA: NEGATIVE
HBV CORE IGM SERPL QL IA: NEGATIVE
HBV SURFACE AG SERPL QL IA: NEGATIVE
HCV AB S/CO SERPL IA: <0.1 S/CO RATIO (ref 0–0.9)
HIV 1+2 AB+HIV1 P24 AG SERPL QL IA: NON REACTIVE

## 2018-07-09 ENCOUNTER — OFFICE VISIT (OUTPATIENT)
Dept: INTERNAL MEDICINE CLINIC | Age: 23
End: 2018-07-09

## 2018-07-09 VITALS
OXYGEN SATURATION: 97 % | RESPIRATION RATE: 16 BRPM | TEMPERATURE: 98.2 F | SYSTOLIC BLOOD PRESSURE: 127 MMHG | DIASTOLIC BLOOD PRESSURE: 85 MMHG | WEIGHT: 271 LBS | BODY MASS INDEX: 37.94 KG/M2 | HEIGHT: 71 IN | HEART RATE: 70 BPM

## 2018-07-09 DIAGNOSIS — E66.01 SEVERE OBESITY (BMI 35.0-39.9): ICD-10-CM

## 2018-07-09 DIAGNOSIS — Z00.00 VISIT FOR WELL MAN HEALTH CHECK: Primary | ICD-10-CM

## 2018-07-09 NOTE — PROGRESS NOTES
Chief Complaint   Patient presents with    Complete Physical     he is a 26y.o. year old male who presents for CPE. Complete Physical Exam Questions:    1. Do you follow a low fat diet? yes  2. Are you up to date on your Tdap (<10 years)? Yes  3. Have you ever had a Pneumovax vaccine (>65)? Not applicable   QUS16 Unknown   PPSV23 Unknown  4. Have you had Zoster vaccine (>60)? Not applicable  5. Have you had the HPV - Gardasil (13- 26)? No  6. Do you follow an exercise program?  yes  7. Do you smoke?  no If > 65 and smoker, have you had a abdominal aortic aneurysm ultrasound screen? No  8. Do you consider yourself overweight?  yes  9. Is there a family history of CAD< age 48? Yes  10. Is there a family history of Cancer? Yes  11. Do you know your Cancer risks? Yes  12. Have you had a colonoscopy? Not applicable  13. Have you been tested for HIV or other STI's? Yes HIV FDGSN(46-70 y/o)? No   14. Have you had an EKG in the last five years(>50)? Yes  15. Have you had a PSA test done this year (50-69)? Not applicable    Other complaints:   Reviewed and agree with Nurse Note and duplicated in this note. Reviewed PmHx, RxHx, FmHx, SocHx, AllgHx and updated and dated in the chart.     Family History   Problem Relation Age of Onset    Diabetes Father     High Cholesterol Father     Arthritis-osteo Father     Cancer Maternal Grandfather      colon cancer    Cancer Paternal Grandfather      lung cancer    Alcohol abuse Paternal Grandfather        Past Medical History:   Diagnosis Date    AR (allergic rhinitis)     Cholesteatoma     History of perforated ear drum     right    [place] teeth extracted 2012      Social History     Social History    Marital status: SINGLE     Spouse name: N/A    Number of children: N/A    Years of education: N/A     Social History Main Topics    Smoking status: Never Smoker    Smokeless tobacco: Never Used    Alcohol use No    Drug use: No    Sexual activity: No     Other Topics Concern    None     Social History Narrative        Review of Systems - negative except as listed above      Objective:     Vitals:    07/09/18 1555   Weight: 271 lb (122.9 kg)   Height: 5' 11\" (1.803 m)       Physical Examination: General appearance - alert, well appearing, and in no distress  Eyes - pupils equal and reactive, extraocular eye movements intact  Ears - bilateral TM's and external ear canals normal  Nose - normal and patent, no erythema, discharge or polyps  Mouth - mucous membranes moist, pharynx normal without lesions  Neck - supple, no significant adenopathy  Chest - clear to auscultation, no wheezes, rales or rhonchi, symmetric air entry  Heart - normal rate, regular rhythm, normal S1, S2, no murmurs, rubs, clicks or gallops  Abdomen - soft, nontender, nondistended, no masses or organomegaly  Neurological - alert, oriented, normal speech, no focal findings or movement disorder noted  Musculoskeletal - no joint tenderness, deformity or swelling  Extremities - peripheral pulses normal, no pedal edema, no clubbing or cyanosis  Skin - normal coloration and turgor, no rashes, no suspicious skin lesions noted      Assessment/ Plan:   Diagnoses and all orders for this visit:    1. Visit for well man health check  -     CBC W/O DIFF  -     METABOLIC PANEL, COMPREHENSIVE  -     LIPID PANEL  -     VITAMIN D, 25 HYDROXY  -     AMB POC EKG ROUTINE W/ 12 LEADS, INTER & REP    2. Severe obesity (BMI 35.0-39.9) ([facility])       Follow-up Disposition:  Return if symptoms worsen or fail to improve. Labs to be drawn: CBC, CMP, Lipid, Vit d - 25  Patient was informed/counseled on: Body mass index is 37.8 kg/(m^2). Discussed the patient's I have reviewed/discussed the above normal BMI with the patient.   I have recommended the following interventions: dietary management education, guidance, and counseling .     1.) Remember to stay active and/or exercise regularly (I suggest 30-45 minutes daily) 2) For reliable dietary information, go to www. EATRIGHT.org. You may wish to consider seeing the nutritionist at [facility] [phone], also consider the [address] I routinely suggest a complete physical exam once each year (your birth month)    I have discussed the diagnosis with the patient and the intended plan as seen in the above orders. The patient has received an after-visit summary and questions were answered concerning future plans. Medication Side Effects and Warnings were discussed with patient: yes  Patient Labs were reviewed and or requested: yes  Patient Past Records were reviewed and or requested  yes  I have discussed the diagnosis with the patient and the intended plan as seen in the above orders. The patient has received an after-visit summary and questions were answered concerning future plans. Pt agrees to call or return to clinic and/or go to closest ER with any worsening of symptoms. This may include, but not limited to increased fever (>100.4) with NSAIDS or Tylenol, increased edema, confusion, rash, worsening of presenting symptoms.

## 2018-07-09 NOTE — MR AVS SNAPSHOT
[address] [facility] [id number] [phone] Patient: Bassem Platt MRN: [id number] :1995 Visit Information Date & Time Provider Department Dept. Phone Encounter #  
 2018  4:00 PM Quinn Joy MD [id number] Follow-up Instructions Return if symptoms worsen or fail to improve. Upcoming Health Maintenance Date Due DTaP/Tdap/Td series (1 - Tdap) 2019* Influenza Age 5 to Adult 2018 *Topic was postponed. The date shown is not the original due date. Allergies as of 2018  Review Complete On: 2018 By: Quinn Joy MD  
 No Known Allergies Current Immunizations  Reviewed on 2012 Name Date PPD 2012, 2011 Not reviewed this visit You Were Diagnosed With   
  
 Codes Comments Visit for well man health check    -  Primary ICD-10-CM: Z00.00 ICD-9-CM: V70.0 Severe obesity (BMI 35.0-39.9) (HCC)     ICD-10-CM: E66.01 
ICD-9-CM: 278.01 Vitals BP Pulse Temp Resp Height(growth percentile) Weight(growth percentile) 127/85 (BP 1 Location: Right arm, BP Patient Position: Sitting) 70 98.2 °F (36.8 °C) (Oral) 16 5' 11\" (1.803 m) 271 lb (122.9 kg) SpO2 BMI Smoking Status 97% 37.8 kg/m2 Never Smoker Vitals History BMI and BSA Data Body Mass Index Body Surface Area  
 37.8 kg/m 2 2.48 m 2 Preferred Pharmacy Pharmacy Name Phone [facility] [address] [place] [facility] [address] [place] [place] [address] [phone] Your Updated Medication List  
  
   
This list is accurate as of 18  4:14 PM.  Always use your most recent med list.  
  
  
  
  
 magic mouthwash solution Take 15 mL by mouth every four (4) hours as needed for Stomatitis.  Magic mouth wash  Maalox Lidocaine 2% viscous  Diphenhydramine oral solution Pharmacy to mix equal portions of ingredients to a total volume as indicated in the dispense amount. We Performed the Following AMB POC EKG ROUTINE W/ 12 LEADS, INTER & REP [07843 CPT(R)] CBC W/O DIFF [18183 CPT(R)] LIPID PANEL [57382 CPT(R)] METABOLIC PANEL, COMPREHENSIVE [80559 CPT(R)] VITAMIN D, 25 HYDROXY [id number] CPT(R)] Follow-up Instructions Return if symptoms worsen or fail to improve. Introducing [facility]! Dear Lovette Osgood: 
Thank you for requesting a Whisk account. Our records indicate that you already have an active Whisk account. You can access your account anytime at [email or web address] Did you know that you can access your hospital and ER discharge instructions at any time in Whisk? You can also review all of your test results from your hospital stay or ER visit. Additional Information If you have questions, please visit the Frequently Asked Questions section of the Whisk website at [email or web address] Remember, Whisk is NOT to be used for urgent needs. For medical emergencies, dial 911. Now available from your iPhone and Android! Please provide this summary of care documentation to your next provider. Your primary care clinician is listed as Emanuel Parker. If you have any questions after today's visit, please call [phone].

## 2018-07-13 LAB
25(OH)D3+25(OH)D2 SERPL-MCNC: 26.4 NG/ML (ref 30–100)
ALBUMIN SERPL-MCNC: 4.4 G/DL (ref 3.5–5.5)
ALBUMIN/GLOB SERPL: 1.6 {RATIO} (ref 1.2–2.2)
ALP SERPL-CCNC: 83 IU/L (ref 39–117)
ALT SERPL-CCNC: 27 IU/L (ref 0–44)
AST SERPL-CCNC: 27 IU/L (ref 0–40)
BILIRUB SERPL-MCNC: 0.7 MG/DL (ref 0–1.2)
BUN SERPL-MCNC: 20 MG/DL (ref 6–20)
BUN/CREAT SERPL: 17 (ref 9–20)
CALCIUM SERPL-MCNC: 9.7 MG/DL (ref 8.7–10.2)
CHLORIDE SERPL-SCNC: 101 MMOL/L (ref 96–106)
CHOLEST SERPL-MCNC: 141 MG/DL (ref 100–199)
CO2 SERPL-SCNC: 23 MMOL/L (ref 20–29)
CREAT SERPL-MCNC: 1.16 MG/DL (ref 0.76–1.27)
ERYTHROCYTE [DISTWIDTH] IN BLOOD BY AUTOMATED COUNT: 13.7 % (ref 12.3–15.4)
GLOBULIN SER CALC-MCNC: 2.7 G/DL (ref 1.5–4.5)
GLUCOSE SERPL-MCNC: 84 MG/DL (ref 65–99)
HCT VFR BLD AUTO: 45 % (ref 37.5–51)
HDLC SERPL-MCNC: 38 MG/DL
HGB BLD-MCNC: 15.3 G/DL (ref 13–17.7)
LDLC SERPL CALC-MCNC: 81 MG/DL (ref 0–99)
MCH RBC QN AUTO: 30.8 PG (ref 26.6–33)
MCHC RBC AUTO-ENTMCNC: 34 G/DL (ref 31.5–35.7)
MCV RBC AUTO: 91 FL (ref 79–97)
PLATELET # BLD AUTO: 206 X10E3/UL (ref 150–379)
POTASSIUM SERPL-SCNC: 4.7 MMOL/L (ref 3.5–5.2)
PROT SERPL-MCNC: 7.1 G/DL (ref 6–8.5)
RBC # BLD AUTO: 4.96 X10E6/UL (ref 4.14–5.8)
SODIUM SERPL-SCNC: 140 MMOL/L (ref 134–144)
TRIGL SERPL-MCNC: 110 MG/DL (ref 0–149)
VLDLC SERPL CALC-MCNC: 22 MG/DL (ref 5–40)
WBC # BLD AUTO: 5.6 X10E3/UL (ref 3.4–10.8)

## 2018-07-13 NOTE — PROGRESS NOTES
Your results show low vitamin d level. Take over the counter vit d supplements 1000 International Units (IU's) daily. Come in to recheck this lab in 3-6 months. Otherwise normal labs.

## 2019-01-04 ENCOUNTER — OFFICE VISIT (OUTPATIENT)
Dept: INTERNAL MEDICINE CLINIC | Age: 24
End: 2019-01-04

## 2019-01-04 VITALS
HEIGHT: 71 IN | RESPIRATION RATE: 16 BRPM | HEART RATE: 70 BPM | TEMPERATURE: 98.3 F | WEIGHT: 249.9 LBS | BODY MASS INDEX: 34.98 KG/M2 | OXYGEN SATURATION: 98 % | SYSTOLIC BLOOD PRESSURE: 123 MMHG | DIASTOLIC BLOOD PRESSURE: 74 MMHG

## 2019-01-04 DIAGNOSIS — J02.9 SORE THROAT: ICD-10-CM

## 2019-01-04 DIAGNOSIS — J02.9 PHARYNGITIS, UNSPECIFIED ETIOLOGY: Primary | ICD-10-CM

## 2019-01-04 LAB
S PYO AG THROAT QL: NEGATIVE
VALID INTERNAL CONTROL?: YES

## 2019-01-04 RX ORDER — AMOXICILLIN AND CLAVULANATE POTASSIUM 875; 125 MG/1; MG/1
1 TABLET, FILM COATED ORAL 2 TIMES DAILY
Qty: 20 TAB | Refills: 0 | Status: SHIPPED | OUTPATIENT
Start: 2019-01-04 | End: 2019-01-14

## 2019-01-04 NOTE — PROGRESS NOTES
Chief Complaint Patient presents with  Sore Throat  
 
 
26 y.o. male with sore throat, myalgias, swollen glands, headache and fever for 3 days. No history of rheumatic fever. Other symptoms: sore throat, dry cough and headache. Fever -yes Exudative pharyngitis-no Lymphadenopathy-no Absence of cough-no Reviewed and agree with Nurse Note and duplicated in this note. Reviewed PmHx, RxHx, FmHx, SocHx, AllgHx and updated and dated in the chart. Family History Problem Relation Age of Onset  Diabetes Father  High Cholesterol Father  Arthritis-osteo Father  Cancer Maternal Grandfather   
     colon cancer  Cancer Paternal Grandfather   
     lung cancer  Alcohol abuse Paternal Grandfather Past Medical History:  
Diagnosis Date  AR (allergic rhinitis)  Cholesteatoma  History of perforated ear drum   
 right  Severe obesity (BMI 35.0-39. 9) 7/9/2018  [place] teeth extracted 2012 Social History Socioeconomic History  Marital status: SINGLE Spouse name: Not on file  Number of children: Not on file  Years of education: Not on file  Highest education level: Not on file Tobacco Use  Smoking status: Never Smoker  Smokeless tobacco: Never Used Substance and Sexual Activity  Alcohol use: No  
 Drug use: No  
 Sexual activity: No  
  
 
Review of Systems - negative except as listed above Objective:  
 
Vitals:  
 01/04/19 1607 BP: 123/74 Pulse: 70 Resp: 16 Temp: 98.3 °F (36.8 °C) TempSrc: Oral  
SpO2: 98% Weight: 249 lb 14.4 oz (113.4 kg) Height: 5' 11\" (1.803 m) Physical Examination: General appearance - alert, well appearing, and in no distress Eyes - pupils equal and reactive, extraocular eye movements intact Ears - bilateral TM's and external ear canals normal 
Nose - normal and patent, clear rhinorrhea with erythema Mouth - mucous membranes moist, pharynx normal without lesions Neck - supple, left greater than right cervical lymphadenopathy Chest - clear to auscultation, no wheezes, rales or rhonchi, symmetric air entry Heart - normal rate, regular rhythm, normal S1, S2, no murmurs, rubs, clicks or gallops Abdomen - soft, nontender, nondistended, no masses or organomegaly Musculoskeletal - no joint tenderness, deformity or swelling Extremities - peripheral pulses normal, no pedal edema, no clubbing or cyanosis Skin - normal coloration and turgor, no rashes, no suspicious skin lesions noted Assessment/ Plan:  
Diagnoses and all orders for this visit: 1. Pharyngitis, unspecified etiology 2. Sore throat -     AMB POC RAPID STREP A Other orders 
-     amoxicillin-clavulanate (AUGMENTIN) 875-125 mg per tablet; Take 1 Tab by mouth two (2) times a day for 10 days. Follow-up Disposition: 
Return if symptoms worsen or fail to improve. 1) Remember to stay active and/or exercise regularly (I suggest 30-45 minutes daily) 2) For reliable dietary information, go to www. [employer]AT[employer].org. You may wish to consider seeing the nutritionist at [facility] [phone], also consider the [address] [facility]. 3) I routinely suggest a complete physical exam once each year (your birth month) I have discussed the diagnosis with the patient and the intended plan as seen in the above orders. The patient has received an after-visit summary and questions were answered concerning future plans. Medication Side Effects and Warnings were discussed with patient: yes Patient Labs were reviewed and or requested: yes Patient Past Records were reviewed and or requested  yes I have discussed the diagnosis with the patient and the intended plan as seen in the above orders. Pt agrees to call or return to clinic and/or go to closest ER with any worsening of symptoms.   This may include, but not limited to increased fever (>100.4) with NSAIDS or Tylenol, increased edema, confusion, rash, worsening of presenting symptoms.

## 2019-01-04 NOTE — LETTER
1/4/2019 4:23 PM 
 
Mr. Calvin Solorzano [address] [facility] [id number] To whom it may concern, The above patient is under my care and is unable to take scheduled test due to illness. If you have any questions please feel free to contact our office. Sincerely, Jamal Liu MD

## 2019-01-04 NOTE — PATIENT INSTRUCTIONS
Adults: For nasal congestion, cough and cold/flu symptoms I advised: - Seek medical care if symptoms become more severe or if you develop  
   chest pain, shortness of breath, confusion. 
  - Contact us if your symptoms fail to improve after 7-10 days - Rest as much as possible and stay home from work/school at least 24 hours  
               after last fever - Wash hand frequently and cough/sneeze into your sleeve to help prevent  
    infection of others - Drink plenty of fluids - Ibuprofen (Advil, Motrin) 400-800mg every 6 hours or Aleve 220 mg 1-2 pills every 8 hours for fever, headache, pain - Tylenol extra strength 500 mg every 6 hours for pain, headache, fever 
 - Nasal saline rinses 2-3 times daily for nasal congestion - Mucinex 1200 mg twice daily or Guaifenesin 400 mg every 4 hours for chest  
    congestion - Robitussin DM or Delsym for cough(suppress cough and thin mucus. ) 
 - Cepacol throat lozenges and saline gargles (1 tsp salt in 8 oz water) for sore  
    throat - Tea with honey for cough (buckwheat honey preferred) - Benadryl (diphenhydramine) 50 mg at night for nasal congestion/allergies - Pseudoephedrine 12-hour tablets twice daily for nasal and inner ear    
  -Ask your pharmacist (this is kept behind the counter) -If you have high blood pressure or heart disease, use this    
   medication with caution (ask your doctor), alternative coricidin - Afrin (oxymetazoline) nasal spray 2 sprays in each nostril twice daily for severe  
   congestion.   
   -Do not use this medication for more than 3 days as it may cause \"rebound congestion\". -If you have high blood pressure or heart disease, use this medication  
    with caution (ask your doctor) Children:  
Sit in bathroom with hot shower running for steam.  
 Nasal saline rinses and Neti pot In general for viral respiratory infection -  Aim for drainage/increased airflow Increase fluids - Pedialyte popsicles, Gatorade mixed with 50% water

## 2025-03-09 NOTE — PROGRESS NOTES
Please inform pt that the strep culture did not show group A strep, but does show beta-hemolytic colonies. He was treated with Augmentin and I recommend he complete the course. How is he feeling? Thanks. Verbal report given to [facility].